# Patient Record
(demographics unavailable — no encounter records)

---

## 2025-04-03 NOTE — HISTORY OF PRESENT ILLNESS
[FreeTextEntry1] : Ms. Gates is a 27-year-old woman with a history of depression and anxiety referred for neuropsychological consultation due to difficulties with attention and concentration.   She reported specific difficulties in the following areas: -	Attention: overwhelmed with tasks causing her to forget minor things, as well as getting distracted by other tasks and difficulty concentrating on challenging tasks. -	Executive Functioning: She procrastinates self-care tasks, and has difficulty managing multiple things. Otherwise, she is able to complete tasks with external deadlines, and has no difficulties with planning and organization -	Memory: forgetting things she has to do immediately after being told, and relies on strategies such as lists, reminders and calendars.   She reported these difficulties becoming noticeable in college and feeling more of the effects during her working life. Importantly, she denied any cognitive or behavioral symptoms prior to high school / college.  Developmental History: [place]  Medical History: Orthostatic hypotension (Borderline, untreated); Ulnar collateral ligament reconstruction.   Psychiatric History / Mood: She was diagnosed with depression in 2016 while in high school, and also experience anxiety symptoms but was not formally diagnosed. She took part in CBT in college, and was prescribed low dose Lexapro from [id number], which she found to be helpful.    Substance Use: None  Medications: Topical acne medication; Lexapro in the past.  Family History: ADHD: Sibling  Educational / Work History: Ms. Gates obtained good grades throughout her academic career. She was a straight 'A' student at [employer], and went on to obtain a Bachelor's from [employer], graduating with a 3.5 GPA. She currently works as a full-time physical therapy assistant and looks forward to starting her DPT.  Psychosocial History: Ms. Gates is of [place] descent and grew up in [place], [place]. She is fluent in English and Mandarin. She currently lives on her own in [place], [place] and has an active social life.

## 2025-04-03 NOTE — REASON FOR VISIT
[Patient preference] : as per patient preference [Telehealth (audio & video) - Individual/Group] : This visit was provided via telehealth using real-time 2-way audio visual technology. [Medical Office: ([facility])___] : The provider was located at the medical office in [unfilled]. [Home] : The patient, [unfilled], was located at home, [unfilled], at the time of the visit. [Participant(s) identity verified] : Participant(s) identity verified. [For new patient(s) – practitioner identifies themselves to participants] : Practitioner identifies themselves to participants. [Verbal consent obtained from patient/other participant(s)] : Verbal consent for telehealth/telephonic services obtained from patient/other participant(s) [Consultation for neuropsychological evaluation] : Consultation for neuropsychological evaluation [Patient] : Patient

## 2025-04-03 NOTE — DISCUSSION/SUMMARY
[FreeTextEntry8] : Ms. Gates presents for consultation with cognitive and behavioral concerns that are impacting her daily life which raise concern for the presence of Attention-Deficit Hyperactivity Disorder (ADHD). While she reported difficulty with sustaining attention, distractibility and restlessness, these issues have not significantly impacted her overall functioning. Additionally, with the onset of these symptoms occurring in late adolescence/ early adulthood, and not impacting her overall productivity, it is not indicative of a neurodevelopmental disorder such as ADHD. Instead, it may be indicative of normal intra-individual variability versus secondary to mood symptoms, which also emerged in late adolescence and may have increased in the context of increased academic, work, and personal life demands.  .  [FreeTextEntry4] : .  -	Psychotherapy -	Discussion of medication management for mood